# Patient Record
Sex: MALE
[De-identification: names, ages, dates, MRNs, and addresses within clinical notes are randomized per-mention and may not be internally consistent; named-entity substitution may affect disease eponyms.]

---

## 2020-12-03 ENCOUNTER — APPOINTMENT (OUTPATIENT)
Dept: PULMONOLOGY | Facility: CLINIC | Age: 45
End: 2020-12-03
Payer: COMMERCIAL

## 2020-12-03 VITALS
DIASTOLIC BLOOD PRESSURE: 77 MMHG | OXYGEN SATURATION: 97 % | HEART RATE: 77 BPM | BODY MASS INDEX: 27.77 KG/M2 | HEIGHT: 70 IN | SYSTOLIC BLOOD PRESSURE: 126 MMHG | TEMPERATURE: 98.2 F | WEIGHT: 194 LBS

## 2020-12-03 DIAGNOSIS — Z83.3 FAMILY HISTORY OF DIABETES MELLITUS: ICD-10-CM

## 2020-12-03 DIAGNOSIS — Z82.49 FAMILY HISTORY OF ISCHEMIC HEART DISEASE AND OTHER DISEASES OF THE CIRCULATORY SYSTEM: ICD-10-CM

## 2020-12-03 PROCEDURE — 99072 ADDL SUPL MATRL&STAF TM PHE: CPT

## 2020-12-03 PROCEDURE — 99204 OFFICE O/P NEW MOD 45 MIN: CPT

## 2020-12-03 NOTE — REVIEW OF SYSTEMS
[EDS: ESS=____] : daytime somnolence: ESS=[unfilled] [Fatigue] : fatigue [Recent Wt Gain (___ Lbs)] : recent [unfilled] ~Ulb weight gain [Negative] : Psychiatric

## 2020-12-04 NOTE — PHYSICAL EXAM
[Normal Appearance] : normal appearance [General Appearance - In No Acute Distress] : no acute distress [Normal Conjunctiva] : the conjunctiva exhibited no abnormalities [Normal Oropharynx] : normal oropharynx [Micrognathia] : micrognathia [II] : II [Neck Appearance] : the appearance of the neck was normal [Neck Cervical Mass (___cm)] : no neck mass was observed [Jugular Venous Distention Increased] : there was no jugular-venous distention [Apical Impulse] : the apical impulse was normal [Heart Rate And Rhythm] : heart rate was normal and rhythm regular [Heart Sounds] : normal S1 and S2 [Heart Sounds Gallop] : no gallops [Exaggerated Use Of Accessory Muscles For Inspiration] : no accessory muscle use [Auscultation Breath Sounds / Voice Sounds] : lungs were clear to auscultation bilaterally [Chest Palpation] : palpation of the chest revealed no abnormalities [Lungs Percussion] : the lungs were normal to percussion [Abnormal Walk] : normal gait [Skin Color & Pigmentation] : normal skin color and pigmentation [Skin Turgor] : normal skin turgor [No Focal Deficits] : no focal deficits [Oriented To Time, Place, And Person] : oriented to person, place, and time [Impaired Insight] : insight and judgment were intact [Affect] : the affect was normal [Mood] : the mood was normal [Nail Clubbing] : no clubbing of the fingernails [Cyanosis, Localized] : no localized cyanosis [Petechial Hemorrhages (___cm)] : no petechial hemorrhages [] : no ischemic changes

## 2020-12-04 NOTE — HISTORY OF PRESENT ILLNESS
[FreeTextEntry1] : 12/03/2020 :  HILLARY VENTURA is a 45 year male with no significant PMHx who is here for initial evaluation of sleep apnea. \par He reports snores and had been having bruxism to the extend that has lost 3 teeth and broken bone. He never saw any sleep specialist. 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 He feels sleepy during the day and tired but denies apnea.\par \par Sleep Routine:\par \par He goes to bed at 11, sleep latency is about 5 min, he wakes up twice.  WASO is about 10 min, and then he is up at 5A. He does not nap . His ESS is 4/24.\par \par He gained about 10lb since the pandemic but denies cataplexy, RLS, parasomnia, or any other sleep behavioral issues.\par \par \par \par

## 2020-12-04 NOTE — ASSESSMENT
[FreeTextEntry1] : obstructive sleep apnea likely, poor sleep quality may be contributing to bruxism, and one possible treatment for both obstructive sleep apnea and bruxism could be oral appliance (mandibular advancer)

## 2020-12-13 ENCOUNTER — APPOINTMENT (OUTPATIENT)
Dept: SLEEP CENTER | Facility: HOME HEALTH | Age: 45
End: 2020-12-13
Payer: COMMERCIAL

## 2020-12-13 ENCOUNTER — OUTPATIENT (OUTPATIENT)
Dept: OUTPATIENT SERVICES | Facility: HOSPITAL | Age: 45
LOS: 1 days | End: 2020-12-13
Payer: COMMERCIAL

## 2020-12-13 PROCEDURE — 95800 SLP STDY UNATTENDED: CPT | Mod: 26

## 2020-12-13 PROCEDURE — 95800 SLP STDY UNATTENDED: CPT

## 2020-12-14 DIAGNOSIS — G47.33 OBSTRUCTIVE SLEEP APNEA (ADULT) (PEDIATRIC): ICD-10-CM

## 2020-12-23 ENCOUNTER — APPOINTMENT (OUTPATIENT)
Dept: PULMONOLOGY | Facility: CLINIC | Age: 45
End: 2020-12-23

## 2020-12-31 ENCOUNTER — APPOINTMENT (OUTPATIENT)
Dept: PULMONOLOGY | Facility: CLINIC | Age: 45
End: 2020-12-31
Payer: COMMERCIAL

## 2020-12-31 VITALS
DIASTOLIC BLOOD PRESSURE: 93 MMHG | TEMPERATURE: 98.6 F | HEIGHT: 70 IN | OXYGEN SATURATION: 97 % | SYSTOLIC BLOOD PRESSURE: 147 MMHG | BODY MASS INDEX: 28.63 KG/M2 | HEART RATE: 60 BPM | WEIGHT: 200 LBS

## 2020-12-31 DIAGNOSIS — R06.83 SNORING: ICD-10-CM

## 2020-12-31 DIAGNOSIS — G47.63 SLEEP RELATED BRUXISM: ICD-10-CM

## 2020-12-31 DIAGNOSIS — Z72.820 SLEEP DEPRIVATION: ICD-10-CM

## 2020-12-31 PROCEDURE — 99072 ADDL SUPL MATRL&STAF TM PHE: CPT

## 2020-12-31 PROCEDURE — 99213 OFFICE O/P EST LOW 20 MIN: CPT

## 2020-12-31 NOTE — HISTORY OF PRESENT ILLNESS
[FreeTextEntry1] : 12/03/2020 :  HILLARY VENTURA is a 45 year male with no significant PMHx who is here for initial evaluation of sleep apnea. \par He reports snores and had been having bruxism to the extend that has lost 3 teeth and broken bone. He never saw any sleep specialist. 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 He feels sleepy during the day and tired but denies apnea.\par \par Sleep Routine:\par \par He goes to bed at 11, sleep latency is about 5 min, he wakes up twice.  WASO is about 10 min, and then he is up at 5A. He does not nap . His ESS is 4/24.\par \par He gained about 10lb since the pandemic but denies cataplexy, RLS, parasomnia, or any other sleep behavioral issues.\par \par \par 12/31/2020: There have been no significant medical events and no new medications since the patient was last seen.   Reviewed recent .hst- no significant obstructive sleep apnea with Apnea Hypopnea Index 1.\par

## 2020-12-31 NOTE — ASSESSMENT
[FreeTextEntry1] : no significant obstructive sleep apnea, mostly sleep deprived.  Counselled to get more sleep, nap when possible. f/u prn. Do not think bruxism related to sleep disordered breathing